# Patient Record
Sex: FEMALE | Race: WHITE | URBAN - METROPOLITAN AREA
[De-identification: names, ages, dates, MRNs, and addresses within clinical notes are randomized per-mention and may not be internally consistent; named-entity substitution may affect disease eponyms.]

---

## 2018-12-05 ENCOUNTER — TELEPHONE (OUTPATIENT)
Dept: OTHER | Facility: CLINIC | Age: 58
End: 2018-12-05

## 2019-01-13 ENCOUNTER — OFFICE VISIT - HEALTHEAST (OUTPATIENT)
Dept: FAMILY MEDICINE | Facility: CLINIC | Age: 59
End: 2019-01-13

## 2019-01-13 DIAGNOSIS — J01.10 ACUTE NON-RECURRENT FRONTAL SINUSITIS: ICD-10-CM

## 2019-01-13 RX ORDER — BUPROPION HYDROCHLORIDE 100 MG/1
TABLET, EXTENDED RELEASE ORAL
Refills: 3 | Status: SHIPPED | COMMUNITY
Start: 2018-12-13

## 2019-01-13 RX ORDER — CITALOPRAM HYDROBROMIDE 20 MG/1
TABLET ORAL
Refills: 3 | Status: SHIPPED | COMMUNITY
Start: 2018-12-13

## 2019-01-13 RX ORDER — ATORVASTATIN CALCIUM 10 MG/1
10 TABLET, FILM COATED ORAL DAILY
Refills: 3 | Status: SHIPPED | COMMUNITY
Start: 2018-12-13

## 2019-01-13 RX ORDER — CITALOPRAM HYDROBROMIDE 10 MG/1
TABLET ORAL
Refills: 3 | Status: SHIPPED | COMMUNITY
Start: 2018-12-13

## 2019-01-13 RX ORDER — TRIAMCINOLONE ACETONIDE 5 MG/G
CREAM TOPICAL
Refills: 0 | Status: SHIPPED | COMMUNITY
Start: 2018-07-06

## 2021-06-02 VITALS — WEIGHT: 200.5 LBS

## 2021-06-17 NOTE — PATIENT INSTRUCTIONS - HE
Patient Instructions by Isac Maravilla PA-C at 1/13/2019  2:40 PM     Author: Isac Maravilla PA-C Service: -- Author Type: Physician Assistant    Filed: 1/13/2019  3:16 PM Encounter Date: 1/13/2019 Status: Signed    : Isac Maravilla PA-C (Physician Assistant)       Over-the-counter nasal steroid spray, follow packaging directions  Over-the-counter Mucinex, follow packaging directions  Hot packs 3 times per day to the forehead and face over the tender sinuses  Distal saline salt water or saline irrigation with Raven Montes  Antibiotic as written below.  Risks and benefits of the medication were gone over.  Indication for return to see urgent care or family practice provider for reevaluation and treatment.      Acute Bacterial Rhinosinusitis (ABRS)  Acute bacterial rhinosinusitis (ABRS) is an infection of your nasal cavity and sinuses. Its caused by bacteria. Acute means that youve had symptoms for less than 12 weeks.  Understanding your sinuses  The nasal cavity is the large air-filled space behind your nose. The sinuses are a group of spaces formed by the bones of your face. They connect with your nasal cavity. ABRS causes the tissue lining these spaces to become inflamed. Mucus may not drain normally. This leads to facial pain and other symptoms.  What causes ABRS?  ABRS most often follows an upper respiratory infection caused by a virus. Bacteria then infect the lining of your nasal cavity and sinuses. But you can also get ABRS if you have:    Nasal allergies    Long-term nasal swelling and congestion not caused by allergies    Blockage in the nose  Symptoms of ABRS  The symptoms of ABRS may be different for each person, and can include:    Nasal congestion    Runny nose    Fluid draining from the nose down the throat (postnasal drip)    Headache    Cough    Pain in the sinuses    Thick, colored fluid from the nose (mucus)    Fever  Diagnosing ABRS  ABRS may be diagnosed if youve had an upper respiratory  infection like a cold and cough for longer than 10 to 14 days. Your health care provider will ask about your symptoms and your medical history. The provider will check your vital signs, including your temperature. Youll have a physical exam. The health care provider will check your ears, nose, and throat. You likely wont need any tests. If ABRS comes back, you may have a culture or other tests.  Treatment for ABRS  Treatment may include:    Antibiotic medicine. This is for symptoms that last for at least 10 to 14 days.    Nasal corticosteroid medicine. Drops or spray used in the nose can lessen swelling and congestion.    Over-the-counter pain medicine. This is to lessen sinus pain and pressure.    Nasal decongestant medicine. Spray or drops may help to lessen congestion. Do not use them for more than a few days.    Salt wash (saline irrigation). This can help to loosen mucus.  Possible complications of ABRS  ABRS may come back or become long-term (chronic).  In rare cases, ABRS may cause complications such as:     Inflamed tissue around the brain and spinal cord (meningitis)    Inflamed tissue around the eyes (orbital cellulitis)    Inflamed bones around the sinuses (osteitis)  These problems may need to be treated in a hospital with intravenous (IV) antibiotic medicine or surgery.  When to call the health care provider  Call your health care provider if you have any of the following:    Symptoms that dont get better, or get worse    Symptoms that dont get better after 3 to 5 days on antibiotics    Trouble seeing    Swelling around your eyes    Confusion or trouble staying awake   Date Last Reviewed: 3/3/2015    6493-8006 [employer]. [address] [place] [address], [place] [address]. All rights reserved. This information is not intended as a substitute for professional medical care. Always follow your healthcare professional's instructions.

## 2021-06-27 NOTE — PROGRESS NOTES
Progress Notes by Isac aMravilla PA-C at 1/13/2019  2:40 PM     Author: Isac Maravilla PA-C Service: -- Author Type: Physician Assistant    Filed: 1/13/2019  4:37 PM Encounter Date: 1/13/2019 Status: Signed    : Isac Maravilla PA-C (Physician Assistant)       Subjective:      Patient ID: Fanta Pettit is a 58 y.o. female.    Chief Complaint:    HPI  Fanta Pettit is a 58 y.o. female who presents today complaining of five days of acute onset facial frontal and maxillary pain and pressure that is radiating to the teeth and to the jaw.  Patient has a headache that is made worse with dependency.  Postnasal drainage.  Has had cough and nausea from the drainage into the stomach. Wakes up at night with the drainage.    Using Dayquil severe cold and Flu with phenylephrine. Has had one bout of dizziness and facial pain and pressure.    Denies fever chills night sweats epistaxis or other constitutional symptoms.  Has not tried any treatment for this at home.      No past medical history on file.    No past surgical history on file.    No family history on file.    Social History     Tobacco Use   ? Smoking status: Never Smoker   ? Smokeless tobacco: Never Used   Substance Use Topics   ? Alcohol use: Not on file   ? Drug use: Not on file       Review of Systems  As above in HPI, otherwise balance of Review of Systems are negative.    Objective:     /70   Pulse 64   Temp 98.9  F (37.2  C) (Oral)   Resp 12   Wt 200 lb 8 oz (90.9 kg)   SpO2 95%     Physical Exam  General: Patient is resting comfortably no acute distress is afebrile  HEENT: Head is normocephalic atraumatic   Frontal and maxillary sinuses are tender to percussion  eyes are PERRL   EOMI sclera anicteric   TMs are clear bilaterally  Throat is with mild posterior pharyngeal wall exudate but no erythema  No cervical lymphadenopathy present  Lungs: Clear to auscultation bilaterally  Heart: Regular rate and rhythm  Skin: Without rash  non-diaphoretic      Assessment:     Procedures    1. Acute non-recurrent frontal sinusitis  amoxicillin (AMOXIL) 875 MG tablet       Plan:     1. Acute non-recurrent frontal sinusitis  amoxicillin (AMOXIL) 875 MG tablet         Patient Instructions     Over-the-counter nasal steroid spray, follow packaging directions  Over-the-counter Mucinex, follow packaging directions  Hot packs 3 times per day to the forehead and face over the tender sinuses  Distal saline salt water or saline irrigation with Addie Montes  Antibiotic as written below.  Risks and benefits of the medication were gone over.  Indication for return to see urgent care or family practice provider for reevaluation and treatment.      Acute Bacterial Rhinosinusitis (ABRS)  Acute bacterial rhinosinusitis (ABRS) is an infection of your nasal cavity and sinuses. Its caused by bacteria. Acute means that youve had symptoms for less than 12 weeks.  Understanding your sinuses  The nasal cavity is the large air-filled space behind your nose. The sinuses are a group of spaces formed by the bones of your face. They connect with your nasal cavity. ABRS causes the tissue lining these spaces to become inflamed. Mucus may not drain normally. This leads to facial pain and other symptoms.  What causes ABRS?  ABRS most often follows an upper respiratory infection caused by a virus. Bacteria then infect the lining of your nasal cavity and sinuses. But you can also get ABRS if you have:    Nasal allergies    Long-term nasal swelling and congestion not caused by allergies    Blockage in the nose  Symptoms of ABRS  The symptoms of ABRS may be different for each person, and can include:    Nasal congestion    Runny nose    Fluid draining from the nose down the throat (postnasal drip)    Headache    Cough    Pain in the sinuses    Thick, colored fluid from the nose (mucus)    Fever  Diagnosing ABRS  ABRS may be diagnosed if youve had an upper respiratory infection like a cold  and cough for longer than 10 to 14 days. Your health care provider will ask about your symptoms and your medical history. The provider will check your vital signs, including your temperature. Youll have a physical exam. The health care provider will check your ears, nose, and throat. You likely wont need any tests. If ABRS comes back, you may have a culture or other tests.  Treatment for ABRS  Treatment may include:    Antibiotic medicine. This is for symptoms that last for at least 10 to 14 days.    Nasal corticosteroid medicine. Drops or spray used in the nose can lessen swelling and congestion.    Over-the-counter pain medicine. This is to lessen sinus pain and pressure.    Nasal decongestant medicine. Spray or drops may help to lessen congestion. Do not use them for more than a few days.    Salt wash (saline irrigation). This can help to loosen mucus.  Possible complications of ABRS  ABRS may come back or become long-term (chronic).  In rare cases, ABRS may cause complications such as:     Inflamed tissue around the brain and spinal cord (meningitis)    Inflamed tissue around the eyes (orbital cellulitis)    Inflamed bones around the sinuses (osteitis)  These problems may need to be treated in a hospital with intravenous (IV) antibiotic medicine or surgery.  When to call the health care provider  Call your health care provider if you have any of the following:    Symptoms that dont get better, or get worse    Symptoms that dont get better after 3 to 5 days on antibiotics    Trouble seeing    Swelling around your eyes    Confusion or trouble staying awake   Date Last Reviewed: 3/3/2015    [id number] [employer]. [address] [place] [address], [place] [address]. All rights reserved. This information is not intended as a substitute for professional medical care. Always follow your healthcare professional's instructions.